# Patient Record
Sex: MALE | Race: WHITE | NOT HISPANIC OR LATINO | URBAN - METROPOLITAN AREA
[De-identification: names, ages, dates, MRNs, and addresses within clinical notes are randomized per-mention and may not be internally consistent; named-entity substitution may affect disease eponyms.]

---
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
## 2019-07-30 DIAGNOSIS — F40.10 SOCIAL ANXIETY DISORDER: ICD-10-CM

## 2019-07-30 RX ORDER — CITALOPRAM 40 MG/1
40 TABLET, FILM COATED ORAL DAILY
Qty: 30 TABLET | Refills: 0 | Status: SHIPPED | OUTPATIENT
Start: 2019-07-30 | End: 2019-08-21 | Stop reason: SDUPTHER

## 2019-08-20 DIAGNOSIS — F40.10 SOCIAL ANXIETY DISORDER: ICD-10-CM

## 2019-08-20 RX ORDER — BUPROPION HYDROCHLORIDE 300 MG/1
300 TABLET ORAL DAILY
Qty: 30 TABLET | Refills: 1 | Status: SHIPPED | OUTPATIENT
Start: 2019-08-20 | End: 2019-08-21 | Stop reason: SDUPTHER

## 2019-08-21 ENCOUNTER — OFFICE VISIT (OUTPATIENT)
Dept: PSYCHIATRY | Facility: CLINIC | Age: 19
End: 2019-08-21
Payer: COMMERCIAL

## 2019-08-21 VITALS
SYSTOLIC BLOOD PRESSURE: 116 MMHG | WEIGHT: 119.5 LBS | HEART RATE: 99 BPM | HEIGHT: 69 IN | DIASTOLIC BLOOD PRESSURE: 45 MMHG | BODY MASS INDEX: 17.7 KG/M2

## 2019-08-21 DIAGNOSIS — F40.10 SOCIAL ANXIETY DISORDER: Primary | ICD-10-CM

## 2019-08-21 PROCEDURE — 99999 PR PBB SHADOW E&M-EST. PATIENT-LVL III: CPT | Mod: PBBFAC,,, | Performed by: PSYCHIATRY & NEUROLOGY

## 2019-08-21 PROCEDURE — 99213 PR OFFICE/OUTPT VISIT, EST, LEVL III, 20-29 MIN: ICD-10-PCS | Mod: S$GLB,,, | Performed by: PSYCHIATRY & NEUROLOGY

## 2019-08-21 PROCEDURE — 90833 PR PSYCHOTHERAPY W/PATIENT W/E&M, 30 MIN (ADD ON): ICD-10-PCS | Mod: S$GLB,,, | Performed by: PSYCHIATRY & NEUROLOGY

## 2019-08-21 PROCEDURE — 99213 OFFICE O/P EST LOW 20 MIN: CPT | Mod: S$GLB,,, | Performed by: PSYCHIATRY & NEUROLOGY

## 2019-08-21 PROCEDURE — 99999 PR PBB SHADOW E&M-EST. PATIENT-LVL III: ICD-10-PCS | Mod: PBBFAC,,, | Performed by: PSYCHIATRY & NEUROLOGY

## 2019-08-21 PROCEDURE — 90833 PSYTX W PT W E/M 30 MIN: CPT | Mod: S$GLB,,, | Performed by: PSYCHIATRY & NEUROLOGY

## 2019-08-21 RX ORDER — BUPROPION HYDROCHLORIDE 300 MG/1
300 TABLET ORAL DAILY
Qty: 30 TABLET | Refills: 5 | Status: SHIPPED | OUTPATIENT
Start: 2019-08-21 | End: 2020-06-03 | Stop reason: SDUPTHER

## 2019-08-21 RX ORDER — CITALOPRAM 40 MG/1
40 TABLET, FILM COATED ORAL DAILY
Qty: 30 TABLET | Refills: 5 | Status: SHIPPED | OUTPATIENT
Start: 2019-08-21 | End: 2020-06-03 | Stop reason: SDUPTHER

## 2019-08-21 NOTE — PROGRESS NOTES
"Outpatient Psychiatry Follow-Up Visit (MD/NP)  8/21/2019  Clinical Status of Patient:  Outpatient (Ambulatory)    Chief Complaint:  Dionisio Brown Jr. is a 19 y.o. male who presents today for follow-up of anxiety and severe social relatedness problems.  Met with patient and mother.    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 More family participation, although this is usually limited to responses to others, little spontaneously generated speech. He has not made any friends at school yet, nor can he say that he has identified any peers anywhere that share his interests (science, history, off line RPGs, current events/news reading). Is now able to use a public restroom "as long as there are stalls with doors." Unable to either seek or refuse help in a store, answers phone calls from people he knows unreliably, rarely if ever initiates calls.     Parents express concerns about both size of school and distance from home- they are somewhat surprised by his interest in going out-of-state. We discuss some skills he could work on between now and graduating HS that would improve his chances of a comfortable and happy transition.    Psychotherapy:  · Target symptoms: anxiety   · Why chosen therapy is appropriate versus another modality: relevant to diagnosis, evidence based practice  · Outcome monitoring methods: self-report, observation, teacher report, feedback from family  · Therapeutic intervention type: CBT, Supportive  · Topics discussed/themes: relationships difficulties, difficulty managing affect in interpersonal " relationships, building skills sets for symptom management, symptom recognition  · The patient's response to the intervention is accepting. The patient's progress toward treatment goals is fair.   · Duration of intervention: 22 minutes.    Review of Systems   GENERAL:  No weightloss  SKIN:  Mild facial acne  HEAD:  No headaches  EYES:  No decrease in vision  EARS:  No dizziness, tinnitus or hearing loss  NOSE:  No changes in smell  MOUTH & THROAT:  Brushes his teeth reliably  CHEST:  No shortness of breath, hyperventilation or cough  CARDIOVASCULAR:  No tachycardia or chest pain  ABDOMEN:  No nausea, vomiting, pain, constipation or diarrhea  URINARY:  No frequency, dysuria or sexual dysfunction  ENDOCRINE:  No polydipsia, polyuria  NEUROLOGIC:  No weakness, sensory changes, seizures, confusion, memory loss, tremor or other abnormal movements      Past Medical, Family and Social History: The patient's past medical, family and social history have been reviewed and updated as appropriate within the electronic medical record - see encounter notes.  Past Medical History:   Diagnosis Date    Anxiety     Social anxiety disorder      Current Outpatient Medications on File Prior to Visit   Medication Sig Dispense Refill    buPROPion (WELLBUTRIN XL) 300 MG 24 hr tablet Take 1 tablet (300 mg total) by mouth once daily. 30 tablet 1    citalopram (CELEXA) 40 MG tablet Take 1 tablet (40 mg total) by mouth once daily. 30 tablet 0     No current facility-administered medications on file prior to visit.        Compliance: yes  Side effects: None. He denies any problems with sweating, flushing, headache, nausea, tremor, dystonia, tics, insomnia, excessively vivid dreams, or sexual dysfunction.    Risk Parameters:  Patient reports no suicidal ideation  Patient reports no homicidal ideation  Patient reports no self-injurious behavior  Patient reports no violent behavior    Exam (detailed: at least 9 elements; comprehensive: all 15  "elements)   Constitutional  Vitals:     Vitals:    08/21/19 1630   BP: (!) 116/45   Pulse: 99   Weight: 54.2 kg (119 lb 7.8 oz)   Height: 5' 9" (1.753 m)      Gait: non-ataxic  Mo tics or tremor observed  Appearance/Behavior: Fairly groomed, calm, cooperative,  clean, very slender, mild facial acne  Speech: soft volume, non-spontaneous  Mood: OK  Affect: constricted, low amplitude. Eye contact 30%.  Thought Process/Thought Content: Linear, goal directed. No evidence of delusions or delusions, no obsessions or compulsions.  Insight:good  Judgement: pseudomature, at extremes of harm avoidance  Cognition: good vocabulary, fund of knowledge advanced.  Orientation: A&O x 3  Recent/Remote Memory: Intact  Attention Span/Concentration: Fair  Language: fluent standard English with no atypicalities of semantics or pragmatics    Assessment and Diagnosis   Status/Progress: Based on the examination today, the patient's problem(s) is/are improved and still not adequately controlled..  New problems have not been presented today.   Co-morbidities and Lack of compliance are not complicating management of the primary condition.  The working differential for this patient includes the possibility of a very high functioning ASD, but my interview today supports that his weaknesses in social reciprocity are all based on anxiety (and the accrued effects on social skill devellopment of severe social anxiety) without the linguisttic and non-linguitis communications impairments, restricted interests, etc expected in an ASD..     General Impression: apparently markedly improved symptoms of social anxiety (>50% reduced), with less improvement in function and skill attainment.  Will benefit from continued evaluation, and continued therapy.  No diagnosis found.  Intervention/Counseling/Treatment Plan   · Medication Management: The risks and benefits of medication were discussed with the patient.  · Counseling provided with patient and family as " follows: importance of compliance with chosen treatment options was emphasized, risks and benefits of treatment options, including medications, were discussed with the patient, risk factor reduction, prognosis   ·  Will continue Celexa  40 mg PO Daily. Will continue Wellbutrin  mg PO Daily. Duration and goals of maintenance medication discussed    Return to Clinic: 6 months     Therapist Yasmin Beaulieu PhD LPC  [phone]  Fax: [phone]

## 2020-06-03 ENCOUNTER — OFFICE VISIT (OUTPATIENT)
Dept: PSYCHIATRY | Facility: CLINIC | Age: 20
End: 2020-06-03
Payer: COMMERCIAL

## 2020-06-03 DIAGNOSIS — F40.10 SOCIAL ANXIETY DISORDER: Primary | ICD-10-CM

## 2020-06-03 PROCEDURE — 99213 OFFICE O/P EST LOW 20 MIN: CPT | Mod: 95,,, | Performed by: PSYCHIATRY & NEUROLOGY

## 2020-06-03 PROCEDURE — 90833 PR PSYCHOTHERAPY W/PATIENT W/E&M, 30 MIN (ADD ON): ICD-10-PCS | Mod: 95,,, | Performed by: PSYCHIATRY & NEUROLOGY

## 2020-06-03 PROCEDURE — 99213 PR OFFICE/OUTPT VISIT, EST, LEVL III, 20-29 MIN: ICD-10-PCS | Mod: 95,,, | Performed by: PSYCHIATRY & NEUROLOGY

## 2020-06-03 PROCEDURE — 90833 PSYTX W PT W E/M 30 MIN: CPT | Mod: 95,,, | Performed by: PSYCHIATRY & NEUROLOGY

## 2020-06-03 RX ORDER — BUPROPION HYDROCHLORIDE 300 MG/1
300 TABLET ORAL DAILY
Qty: 30 TABLET | Refills: 5 | Status: SHIPPED | OUTPATIENT
Start: 2020-06-03 | End: 2021-07-02 | Stop reason: SDUPTHER

## 2020-06-03 RX ORDER — CITALOPRAM 40 MG/1
40 TABLET, FILM COATED ORAL DAILY
Qty: 30 TABLET | Refills: 5 | Status: SHIPPED | OUTPATIENT
Start: 2020-06-03 | End: 2021-07-02 | Stop reason: SDUPTHER

## 2020-06-03 NOTE — PROGRESS NOTES
"Outpatient Psychiatry Follow-Up Visit (MD/NP)  6/3/2020  Clinical Status of Patient:  Outpatient (Ambulatory)  The patient location is: at home in [facility]  The chief complaint leading to consultation is: below  Visit type: simultaneous audio-visual  Total time spent with patient: 25 min  Each patient to whom he or she provides medical services by telemedicine is:  (1) informed of the relationship between the physician and patient and the respective role of any other health care provider with respect to management of the patient; and (2) notified that he or she may decline to receive medical services by telemedicine and may withdraw from such care at any time.    Chief Complaint:  Dionisio Brown JrLisa is a 20 y.o. male who presents today for follow-up of anxiety and severe social relatedness problems.  Met with patient     SY20-21, jerrell at [employer],  computer science major    Interval History and Content of Current Session:  Interim Events/Subjective Report/Content of Current Session:  Dionisio reports that he has been living at home since closure of dorms and [employer] campus in mid March. He completed all his courses by distance learning and now is a rising college senior. This year haad a single dorm room and hopes that will be available in the fall. 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 He denies any recent problems with depressed mood ("I'm a pro at social distancing," he cracks). With current meds he remains able to speak with peers at school when needed, and participate in his now higher level (smaller) classes. More family participation, and more spontaneously generated speech. Still very few friends despite his broad interests (science, history, off line RPGs, current events/news reading). Still able to use a public restroom "as long as there are stalls with doors." Since college he is now able to seek or refuse help in a store. Still resists answering phone calls from people " he knows unreliably, rarely initiates calls, but does not feel distressed by this and does not consider it to cause impairment by itself.     Psychotherapy:  · Target symptoms: anxiety   · Why chosen therapy is appropriate versus another modality: relevant to diagnosis, evidence based practice  · Outcome monitoring methods: self-report, observation, teacher report, feedback from family  · Therapeutic intervention type: CBT, Supportive  · Topics discussed/themes: relationships difficulties, difficulty managing affect in interpersonal relationships, building skills sets for symptom management, symptom recognition  · The patient's response to the intervention is accepting. The patient's progress toward treatment goals is fair.   · Duration of intervention: 16 minutes.    Review of Systems   GENERAL:  No weight loss  SKIN:  Just a little residual facial acne  HEAD:  No headaches  EYES:  No decrease in vision  EARS:  No dizziness, tinnitus or hearing loss  NOSE:  No changes in smell  MOUTH & THROAT:  Brushes his teeth reliably  CHEST:  No shortness of breath, hyperventilation or cough  CARDIOVASCULAR:  No tachycardia or chest pain  ABDOMEN:  No nausea, vomiting, pain, constipation or diarrhea  URINARY:  No frequency, dysuria or sexual dysfunction  ENDOCRINE:  No polydipsia, polyuria  NEUROLOGIC:  No weakness, sensory changes, seizures, confusion, memory loss, tremor or other abnormal movements      Past Medical, Family and Social History: The patient's past medical, family and social history have been reviewed and updated as appropriate within the electronic medical record - see encounter notes.  Past Medical History:   Diagnosis Date    Anxiety     Social anxiety disorder      Current Outpatient Medications on File Prior to Visit   Medication Sig Dispense Refill    buPROPion (WELLBUTRIN XL) 300 MG 24 hr tablet Take 1 tablet (300 mg total) by mouth once daily. 30 tablet 5    citalopram (CELEXA) 40 MG tablet Take 1  tablet (40 mg total) by mouth once daily. 30 tablet 5     No current facility-administered medications on file prior to visit.        Compliance: yes  Side effects: None. He denies any problems with sweating, flushing, headache, nausea, tremor, dystonia, tics, insomnia, excessively vivid dreams, or sexual dysfunction.    Risk Parameters:  Patient reports no suicidal ideation  Patient reports no homicidal ideation  Patient reports no self-injurious behavior  Patient reports no violent behavior    Exam (detailed: at least 9 elements; comprehensive: all 15 elements)   Constitutional  Vitals:     There were no vitals filed for this visit.      Gait: not assessed today  No tics or tremor observed  Appearance/Behavior: Fairly groomed, calm, cooperative,  clean, very slender, mild facial acne  Speech: soft volume, adequate/appropriate quantity, good resiprocity with a very slight touch of speech latency  Mood: euthymic  Affect: full range, low amplitude. Eye contact 30%.  Thought Process/Thought Content: Linear, goal directed.   Thought content has no evidence of delusions or delusions, no obsessions or compulsions.  Insight: good  Judgement: cautious, at extreme end of harm avoidance continuum  Cognition: good vocabulary, fund of knowledge advanced.  Orientation: A&O x 3  Recent/Remote Memory: Intact  Attention Span/Concentration: Fair  Language: fluent standard English with no atypicalities of semantics or pragmatics    Assessment and Diagnosis   Status/Progress: Based on the examination today, the patient's problem(s) is/are improved and still not adequately controlled..  New problems have not been presented today.   Co-morbidities and Lack of compliance are not complicating management of the primary condition.  The working differential for this patient includes the possibility of a very high functioning ASD, but my interview today supports that his weaknesses in social reciprocity are all based on anxiety (and the  accrued effects on social skill devellopment of severe social anxiety) without the linguisttic and non-linguitis communications impairments, restricted interests, etc expected in an ASD..     General Impression: apparently markedly improved symptoms of social anxiety (>50% reduced), with less improvement in function and skill attainment.  Will benefit from continued evaluation, and continued therapy.    ICD-10-CM ICD-9-CM   1. Social anxiety disorder F40.10 300.23     Intervention/Counseling/Treatment Plan   · Medication Management: Continue current medications. prognosis for tapering off them discussed  · Labs, Diagnostic Studies: order none indicated with his current MSE and meds  · Counseling provided with patient as follows: importance of compliance with chosen treatment options was emphasized, prognosis  · Care Coordination: During the visit, care coordination was conducted with  social work.   ·  Will continue Celexa  40 mg PO Daily. Will continue Wellbutrin  mg PO Daily. Duration and goals of maintenance medication discussed. Pandemic and country-wide urban riots is not the time to attempt tapering off antianxiety medication, especially in his situation where the likelihood of a successful med discontinuation is well below average due to past chronicity/long duration of sx    Return to Clinic: 6 months

## 2021-07-02 ENCOUNTER — OFFICE VISIT (OUTPATIENT)
Dept: PSYCHIATRY | Facility: CLINIC | Age: 21
End: 2021-07-02
Payer: COMMERCIAL

## 2021-07-02 DIAGNOSIS — F40.10 SOCIAL ANXIETY DISORDER: Primary | ICD-10-CM

## 2021-07-02 PROCEDURE — 99214 OFFICE O/P EST MOD 30 MIN: CPT | Mod: 95,,, | Performed by: PSYCHIATRY & NEUROLOGY

## 2021-07-02 PROCEDURE — 90833 PSYTX W PT W E/M 30 MIN: CPT | Mod: 95,,, | Performed by: PSYCHIATRY & NEUROLOGY

## 2021-07-02 PROCEDURE — 99214 PR OFFICE/OUTPT VISIT, EST, LEVL IV, 30-39 MIN: ICD-10-PCS | Mod: 95,,, | Performed by: PSYCHIATRY & NEUROLOGY

## 2021-07-02 PROCEDURE — 90833 PR PSYCHOTHERAPY W/PATIENT W/E&M, 30 MIN (ADD ON): ICD-10-PCS | Mod: 95,,, | Performed by: PSYCHIATRY & NEUROLOGY

## 2021-07-02 RX ORDER — BUPROPION HYDROCHLORIDE 300 MG/1
300 TABLET ORAL DAILY
Qty: 30 TABLET | Refills: 5 | Status: SHIPPED | OUTPATIENT
Start: 2021-07-02 | End: 2022-12-07 | Stop reason: SDUPTHER

## 2021-07-02 RX ORDER — CITALOPRAM 40 MG/1
40 TABLET, FILM COATED ORAL DAILY
Qty: 30 TABLET | Refills: 5 | Status: SHIPPED | OUTPATIENT
Start: 2021-07-02 | End: 2022-12-07 | Stop reason: SDUPTHER

## 2021-11-30 ENCOUNTER — PATIENT MESSAGE (OUTPATIENT)
Dept: RESEARCH | Facility: HOSPITAL | Age: 21
End: 2021-11-30
Payer: COMMERCIAL

## 2022-12-07 ENCOUNTER — OFFICE VISIT (OUTPATIENT)
Dept: PSYCHIATRY | Facility: CLINIC | Age: 22
End: 2022-12-07
Payer: COMMERCIAL

## 2022-12-07 DIAGNOSIS — F40.10 SOCIAL ANXIETY DISORDER: Primary | ICD-10-CM

## 2022-12-07 PROCEDURE — 99214 PR OFFICE/OUTPT VISIT, EST, LEVL IV, 30-39 MIN: ICD-10-PCS | Mod: 95,,, | Performed by: PSYCHIATRY & NEUROLOGY

## 2022-12-07 PROCEDURE — 99214 OFFICE O/P EST MOD 30 MIN: CPT | Mod: 95,,, | Performed by: PSYCHIATRY & NEUROLOGY

## 2022-12-07 RX ORDER — CITALOPRAM 40 MG/1
40 TABLET, FILM COATED ORAL DAILY
Qty: 30 TABLET | Refills: 5 | Status: SHIPPED | OUTPATIENT
Start: 2022-12-07

## 2022-12-07 RX ORDER — BUPROPION HYDROCHLORIDE 300 MG/1
300 TABLET ORAL DAILY
Qty: 30 TABLET | Refills: 5 | Status: SHIPPED | OUTPATIENT
Start: 2022-12-07

## 2022-12-07 NOTE — PROGRESS NOTES
"Outpatient Psychiatry Follow-Up Visit (MD/NP)  12/7/2022  Clinical Status of Patient:  Outpatient (Ambulatory)    The patient location is:  [facility]  The chief complaint leading to consultation is: below  Visit type: simultaneous audio-visual  Total time spent with patient: 30 min  Each patient to whom he or she provides medical services by telemedicine is:  (1) informed of the relationship between the physician and patient and the respective role of any other health care provider with respect to management of the patient; and (2) notified that he or she may decline to receive medical services by telemedicine and may withdraw from such care at any time.    Chief Complaint:  Dionisio Brown Jr. is a 22 y.o. male who presents today for follow-up of anxiety and severe social relatedness problems.   Met with patient      for [employer]    Interval History and Content of Current Session:  Interim Events/Subjective Report/Content of Current Session:  Dionisio reports that he has been living at home since closure of dorms and [facility] in mid March. He completed all his courses by distance learning and now is a rising college senior. This year haad a single dorm room and hopes that will be available in the fall. 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 He denies any recent problems with depressed mood ("I'm a pro at social distancing," he cracks). With current meds he remains able to speak with peers at school when needed, and participate in his now higher level (smaller) classes. More family participation, and more spontaneously generated speech. Still very few friends despite his broad interests (science, history, off line RPGs, current events/news reading). Still able to use a public restroom "as long as there are stalls with doors." Since college he is now able to seek or refuse help in a store. Still resists answering phone calls from people he knows unreliably, rarely " initiates calls, but does not feel distressed by this and does not consider it to cause impairment by itself.       Review of Systems   GENERAL:  No weight loss  SKIN:  Just a little residual facial acne  HEAD:  No headaches  EYES:  No decrease in vision  EARS:  No dizziness, tinnitus or hearing loss  NOSE:  No changes in smell  MOUTH & THROAT:  Brushes his teeth reliably  CHEST:  No shortness of breath, hyperventilation or cough  CARDIOVASCULAR:  No tachycardia or chest pain  ABDOMEN:  No nausea, vomiting, pain, constipation or diarrhea  URINARY:  No frequency, dysuria or sexual dysfunction  ENDOCRINE:  No polydipsia, polyuria  NEUROLOGIC:  No weakness, sensory changes, seizures, confusion, memory loss, tremor or other abnormal movements      Past Medical, Family and Social History: The patient's past medical, family and social history have been reviewed and updated as appropriate within the electronic medical record - see encounter notes.  Past Medical History:   Diagnosis Date    Anxiety     Social anxiety disorder      Current Outpatient Medications on File Prior to Visit   Medication Sig Dispense Refill    buPROPion (WELLBUTRIN XL) 300 MG 24 hr tablet Take 1 tablet (300 mg total) by mouth once daily. 30 tablet 5    citalopram (CELEXA) 40 MG tablet Take 1 tablet (40 mg total) by mouth once daily. 30 tablet 5     No current facility-administered medications on file prior to visit.     Compliance: yes  Side effects: None. He denies any problems with sweating, flushing, headache, nausea, tremor, dystonia, tics, insomnia, excessively vivid dreams, or sexual dysfunction.    Risk Parameters:  Patient reports no suicidal ideation  Patient reports no homicidal ideation  Patient reports no self-injurious behavior  Patient reports no violent behavior    Exam (detailed: at least 9 elements; comprehensive: all 15 elements)   Constitutional  Vitals:     There were no vitals filed for this visit.      Gait: not assessed  today  No tics or tremor observed  Appearance/Behavior: Fairly groomed, calm, cooperative,  clean, very slender, mild facial acne  Speech: soft volume, adequate/appropriate quantity, good resiprocity with a very slight touch of speech latency  Mood: euthymic  Affect: full range, low amplitude. Eye contact 30%.  Thought Process/Thought Content: Linear, goal directed.   Thought content has no evidence of delusions or delusions, no obsessions or compulsions.  Insight: good  Judgement: cautious, at extreme end of harm avoidance continuum  Cognition: good vocabulary, fund of knowledge advanced.  Orientation: A&O x 3  Recent/Remote Memory: Intact  Attention Span/Concentration: Fair  Language: fluent standard English with no atypicalities of semantics or pragmatics    Assessment and Diagnosis     Status/Progress: Based on the examination today, the patient's problem(s) is/are improved and well controlled.  New problems have not been presented today.   Co-morbidities and Lack of compliance are not complicating management of the primary condition.  The working differential for this patient includes the possibility of a very high functioning ASD, but my interview today supports that his weaknesses in social reciprocity are all based on anxiety (and the accrued effects on social skill devellopment of severe social anxiety) without the linguisttic and non-linguitis communications impairments, restricted interests, etc expected in an ASD..     General Impression: apparently markedly improved symptoms of social anxiety (>50% reduced), with less improvement in function and skill attainment.  Will benefit from continued evaluation, and continued therapy.    ICD-10-CM ICD-9-CM   1. Social anxiety disorder  F40.10 300.23     Intervention/Counseling/Treatment Plan   Medication Management: Continue current medications.  Labs, Diagnostic Studies: order none indicated with his current MSE and meds  Counseling provided with patient as  follows: importance of compliance with chosen treatment options was emphasized, prognosis  Care Coordination: During the visit, care coordination was conducted with  social work.    Will continue Celexa  40 mg PO Daily. Will continue Wellbutrin  mg PO Daily. Duration and goals of maintenance medication discussed.     Return to Clinic: 6 months